# Patient Record
Sex: MALE | Race: BLACK OR AFRICAN AMERICAN | Employment: FULL TIME | URBAN - METROPOLITAN AREA
[De-identification: names, ages, dates, MRNs, and addresses within clinical notes are randomized per-mention and may not be internally consistent; named-entity substitution may affect disease eponyms.]

---

## 2017-07-15 DIAGNOSIS — I10 ESSENTIAL HYPERTENSION WITH GOAL BLOOD PRESSURE LESS THAN 140/90: ICD-10-CM

## 2017-07-17 RX ORDER — HYDROCHLOROTHIAZIDE 25 MG/1
TABLET ORAL
Qty: 90 TAB | Refills: 0 | Status: SHIPPED | OUTPATIENT
Start: 2017-07-17 | End: 2017-09-27 | Stop reason: SDUPTHER

## 2017-07-17 NOTE — TELEPHONE ENCOUNTER
He is due in for annual visit and fasting labs. He had a [facility] 6/2016 so doesn't have to have this year but does need visit and labs.

## 2017-09-14 ENCOUNTER — OFFICE VISIT (OUTPATIENT)
Dept: FAMILY MEDICINE CLINIC | Age: 47
End: 2017-09-14

## 2017-09-14 VITALS
HEART RATE: 86 BPM | OXYGEN SATURATION: 97 % | DIASTOLIC BLOOD PRESSURE: 90 MMHG | TEMPERATURE: 97.8 F | RESPIRATION RATE: 16 BRPM | WEIGHT: 207 LBS | HEIGHT: 72 IN | BODY MASS INDEX: 28.04 KG/M2 | SYSTOLIC BLOOD PRESSURE: 148 MMHG

## 2017-09-14 DIAGNOSIS — I10 ELEVATED BLOOD PRESSURE READING IN OFFICE WITH DIAGNOSIS OF HYPERTENSION: ICD-10-CM

## 2017-09-14 DIAGNOSIS — E55.9 VITAMIN D DEFICIENCY: ICD-10-CM

## 2017-09-14 DIAGNOSIS — I10 ESSENTIAL HYPERTENSION: Primary | ICD-10-CM

## 2017-09-14 RX ORDER — PILOCARPINE HYDROCHLORIDE 20 MG/ML
SOLUTION/ DROPS OPHTHALMIC
Refills: 1 | COMMUNITY
Start: 2017-07-13

## 2017-09-14 RX ORDER — AMLODIPINE BESYLATE 5 MG/1
5 TABLET ORAL DAILY
Qty: 30 TAB | Refills: 0 | Status: SHIPPED | OUTPATIENT
Start: 2017-09-14 | End: 2017-10-19 | Stop reason: SDUPTHER

## 2017-09-14 NOTE — LETTER
9/18/2017 12:20 PM 
 
Mr. Vandana Gutiérrez Rae [address] [id number] Dear Vandana Marla: 
 
Please find your most recent results below. Resulted Orders VITAMIN D, 25 HYDROXY Result Value Ref Range VITAMIN D, 25-HYDROXY 27.4 (L) 30.0 - 100.0 ng/mL Comment:  
   Vitamin D deficiency has been defined by the [facility] practice guideline as a 
level of serum 25-OH vitamin D less than 20 ng/mL (1,2). The Endocrine Society went on to further define vitamin D 
insufficiency as a level between 21 and 29 ng/mL (2). 1. IOM ([employer] [place] [employer]). 2010. Dietary reference 
   intakes for calcium and D. [facility]: [employer] 
   [employer]. 2. Jihan MF, Catie NC, Ralph LR, et al. 
   Evaluation, treatment, and prevention of vitamin D 
   deficiency: an Endocrine Society clinical practice 
   guideline. JCEM. 2011 Jul; 96(7):1911-30. Narrative Performed at:  [address]  [id number] : Janet Kelley MD, Phone:  [phone] CBC WITH AUTOMATED DIFF Result Value Ref Range WBC 6.1 3.4 - 10.8 x10E3/uL  
 RBC 5.48 4.14 - 5.80 x10E6/uL HGB 15.4 12.6 - 17.7 g/dL HCT 46.3 37.5 - 51.0 % MCV 85 79 - 97 fL  
 MCH 28.1 26.6 - 33.0 pg  
 MCHC 33.3 31.5 - 35.7 g/dL  
 RDW 14.7 12.3 - 15.4 % PLATELET 058 842 - 795 x10E3/uL NEUTROPHILS 68 % Lymphocytes 20 % MONOCYTES 9 % EOSINOPHILS 3 % BASOPHILS 0 %  
 ABS. NEUTROPHILS 4.2 1.4 - 7.0 x10E3/uL Abs Lymphocytes 1.2 0.7 - 3.1 x10E3/uL  
 ABS. MONOCYTES 0.5 0.1 - 0.9 x10E3/uL  
 ABS. EOSINOPHILS 0.2 0.0 - 0.4 x10E3/uL  
 ABS. BASOPHILS 0.0 0.0 - 0.2 x10E3/uL IMMATURE GRANULOCYTES 0 %  
 ABS. IMM. GRANS. 0.0 0.0 - 0.1 x10E3/uL Narrative Performed at:  [address]  [id number] : Janet Kelley MD, Phone:  [phone] URINALYSIS W/ RFLX MICROSCOPIC Result Value Ref Range Specific Gravity 1.016 1.005 - 1.030  
 pH (UA) 6.5 5.0 - 7.5 Color Yellow Yellow Appearance Clear Clear Leukocyte Esterase Negative Negative Protein Negative Negative/Trace Glucose Negative Negative Ketone Negative Negative Blood Negative Negative Bilirubin Negative Negative Urobilinogen 0.2 0.2 - 1.0 mg/dL Nitrites Negative Negative Microscopic Examination Comment Comment:  
   Microscopic not indicated and not performed. Narrative Performed at:  [address]  [id number] : Brett Martinez MD, Phone:  [phone] TSH 3RD GENERATION Result Value Ref Range TSH 1.700 0.450 - 4.500 uIU/mL Narrative Performed at:  [address]  [id number] : Brett Martinez MD, Phone:  [phone] METABOLIC PANEL, COMPREHENSIVE Result Value Ref Range Glucose 94 65 - 99 mg/dL BUN 13 6 - 24 mg/dL Creatinine 1.16 0.76 - 1.27 mg/dL GFR est non-AA 76 >59 mL/min/1.73 GFR est AA 87 >59 mL/min/1.73  
 BUN/Creatinine ratio 11 9 - 20 Sodium 143 134 - 144 mmol/L Potassium 3.4 (L) 3.5 - 5.2 mmol/L Chloride 99 96 - 106 mmol/L  
 CO2 26 18 - 29 mmol/L Calcium 9.6 8.7 - 10.2 mg/dL Protein, total 7.4 6.0 - 8.5 g/dL Albumin 4.6 3.5 - 5.5 g/dL GLOBULIN, TOTAL 2.8 1.5 - 4.5 g/dL A-G Ratio 1.6 1.2 - 2.2 Bilirubin, total 0.6 0.0 - 1.2 mg/dL Alk. phosphatase 106 39 - 117 IU/L  
 AST (SGOT) 19 0 - 40 IU/L  
 ALT (SGPT) 33 0 - 44 IU/L Narrative Performed at:  [address]  [id number] : Brett Martinez MD, Phone:  [phone] LIPID PANEL Result Value Ref Range Cholesterol, total 174 100 - 199 mg/dL Triglyceride 139 0 - 149 mg/dL HDL Cholesterol 40 >39 mg/dL VLDL, calculated 28 5 - 40 mg/dL LDL, calculated 106 (H) 0 - 99 mg/dL Narrative Performed at:  [address]  [id number] : Nell Alfonso MD, Phone:  [phone] CVD REPORT Result Value Ref Range INTERPRETATION Note Comment:  
   Supplement report is available. Narrative Performed at:  [address] [place] [address] [address] [place] [address]  [id number] : Aurelia Woodward PhD, Phone:  [phone] Yissel Gonzales MD

## 2017-09-14 NOTE — PROGRESS NOTES
Chief Complaint   Patient presents with    Blood Pressure Check    Labs     Fasting     1. Have you been to the ER, urgent care clinic since your last visit? Hospitalized since your last visit? No    2. Have you seen or consulted any other health care providers outside of the [facility] since your last visit? Include any pap smears or colon screening. No     Advance Care Planning information reviewed and given to the patient. I have reviewed Health Maintenance with the patient and updated. The patient was counseled on the dangers of tobacco use, and Patient is a non smoker. Reviewed strategies to maximize success, including Continue not to smoke.

## 2017-09-14 NOTE — PROGRESS NOTES
BP's 130/90's at home. Volleyball once weekly. Visit Vitals    /90 (BP 1 Location: Right arm, BP Patient Position: Sitting)    Pulse 86    Temp 97.8 °F (36.6 °C) (Oral)    Resp 16    Ht 6' (1.829 m)    Wt 207 lb (93.9 kg)    SpO2 97%    BMI 28.07 kg/m2       Patient alert and cooperative. Reviewed above. Assessment:  1. HTN with high readings both in the office and at home. Plan:  1. Will add Norvasc 5 mg to current HCTZ. Give it a month. Follow up if benefit for annual script. 2. Fasting labs today. 3. Follow up otherwise prn.

## 2017-09-14 NOTE — MR AVS SNAPSHOT
Visit Information Date & Time Provider Department Dept. Phone Encounter #  
 9/14/2017  9:00 AM Alexx Polo MD [facility] [phone] [id number] Upcoming Health Maintenance Date Due DTaP/Tdap/Td series (2 - Td) 7/2/2018 Allergies as of 9/14/2017  Review Complete On: 9/14/2017 By: Jason Aden RN Severity Noted Reaction Type Reactions Ace Inhibitors  01/20/2011    Cough Current Immunizations  Reviewed on 5/9/2012 Name Date Hepatitis B Vaccine 2/17/2009, 9/17/2008, 8/13/2008 MMR Vaccine 8/13/1977 OPV 8/20/1975, 1972, 1972 TDAP Vaccine 7/2/2008 Varicella Virus Vaccine Live 9/17/2008, 8/19/2008 Not reviewed this visit You Were Diagnosed With   
  
 Codes Comments Essential hypertension    -  Primary ICD-10-CM: I10 
ICD-9-CM: 401.9 Vitamin D deficiency     ICD-10-CM: E55.9 ICD-9-CM: 268.9 Elevated blood pressure reading in office with diagnosis of hypertension     ICD-10-CM: I10 
ICD-9-CM: 401.9 Vitals BP Pulse Temp Resp Height(growth percentile) Weight(growth percentile) 148/90 (BP 1 Location: Right arm, BP Patient Position: Sitting) 86 97.8 °F (36.6 °C) (Oral) 16 6' (1.829 m) 207 lb (93.9 kg) SpO2 BMI Smoking Status 97% 28.07 kg/m2 Never Smoker Vitals History BMI and BSA Data Body Mass Index Body Surface Area 28.07 kg/m 2 2.18 m 2 Preferred Pharmacy Pharm[facility] Name Phone [id number] [address] [phone] Your Updated Medication List  
  
   
This list is accurate as of: 9/14/17  9:24 AM.  Always use your most recent med list. amLODIPine 5 mg tablet Commonly known as:  Remonia Grates Take 1 Tab by mouth daily. Indications: hypertension  
  
 hydroCHLOROthiazide 25 mg tablet Commonly known as:  HYDRODIURIL  
TAKE 1 TABLET DAILY pilocarpine 2 % ophthalmic solution Commonly known as:  PILOCAR INSTILL 1 DROP INTO LEFT EYE AT BEDTIME  
  
 ZYRTEC PO Take  by mouth as needed. Prescriptions Sent to Pharmacy Refills  
 amLODIPine (NORVASC) 5 mg tablet 0 Sig: Take 1 Tab by mouth daily. Indications: hypertension Class: Normal  
 Pharmacy: [facility] #: [phone] Route: Oral  
  
We Performed the Following CBC WITH AUTOMATED DIFF [41157 CPT(R)] LIPID PANEL [28626 CPT(R)] METABOLIC PANEL, COMPREHENSIVE [58764 CPT(R)] TSH 3RD GENERATION [57092 CPT(R)] URINALYSIS W/ RFLX MICROSCOPIC [01841 CPT(R)] VITAMIN D, 25 HYDROXY [id number] CPT(R)] Introducing \[facility] [place] [facility]\"" & [facility] SERVICES! Dear Tessa Das: Thank you for requesting a HStreaming account. Our records indicate that you already have an active HStreaming account. You can access your account anytime at [email or web address] Did you know that you can access your hospital and ER discharge instructions at any time in HStreaming? You can also review all of your test results from your hospital stay or ER visit. Additional Information If you have questions, please visit the Frequently Asked Questions section of the HStreaming website at [email or web address] Remember, HStreaming is NOT to be used for urgent needs. For medical emergencies, dial 911. Now available from your iPhone and Android! Please provide this summary of care documentation to your next provider. Your primary care clinician is listed as [phone]. If you have any questions after today's visit, please call [phone].

## 2017-09-15 LAB
25(OH)D3+25(OH)D2 SERPL-MCNC: 27.4 NG/ML (ref 30–100)
ALBUMIN SERPL-MCNC: 4.6 G/DL (ref 3.5–5.5)
ALBUMIN/GLOB SERPL: 1.6 {RATIO} (ref 1.2–2.2)
ALP SERPL-CCNC: 106 IU/L (ref 39–117)
ALT SERPL-CCNC: 33 IU/L (ref 0–44)
APPEARANCE UR: CLEAR
AST SERPL-CCNC: 19 IU/L (ref 0–40)
BASOPHILS # BLD AUTO: 0 X10E3/UL (ref 0–0.2)
BASOPHILS NFR BLD AUTO: 0 %
BILIRUB SERPL-MCNC: 0.6 MG/DL (ref 0–1.2)
BILIRUB UR QL STRIP: NEGATIVE
BUN SERPL-MCNC: 13 MG/DL (ref 6–24)
BUN/CREAT SERPL: 11 (ref 9–20)
CALCIUM SERPL-MCNC: 9.6 MG/DL (ref 8.7–10.2)
CHLORIDE SERPL-SCNC: 99 MMOL/L (ref 96–106)
CHOLEST SERPL-MCNC: 174 MG/DL (ref 100–199)
CO2 SERPL-SCNC: 26 MMOL/L (ref 18–29)
COLOR UR: YELLOW
CREAT SERPL-MCNC: 1.16 MG/DL (ref 0.76–1.27)
EOSINOPHIL # BLD AUTO: 0.2 X10E3/UL (ref 0–0.4)
EOSINOPHIL NFR BLD AUTO: 3 %
ERYTHROCYTE [DISTWIDTH] IN BLOOD BY AUTOMATED COUNT: 14.7 % (ref 12.3–15.4)
GLOBULIN SER CALC-MCNC: 2.8 G/DL (ref 1.5–4.5)
GLUCOSE SERPL-MCNC: 94 MG/DL (ref 65–99)
GLUCOSE UR QL: NEGATIVE
HCT VFR BLD AUTO: 46.3 % (ref 37.5–51)
HDLC SERPL-MCNC: 40 MG/DL
HGB BLD-MCNC: 15.4 G/DL (ref 12.6–17.7)
HGB UR QL STRIP: NEGATIVE
IMM GRANULOCYTES # BLD: 0 X10E3/UL (ref 0–0.1)
IMM GRANULOCYTES NFR BLD: 0 %
INTERPRETATION, 910389: NORMAL
KETONES UR QL STRIP: NEGATIVE
LDLC SERPL CALC-MCNC: 106 MG/DL (ref 0–99)
LEUKOCYTE ESTERASE UR QL STRIP: NEGATIVE
LYMPHOCYTES # BLD AUTO: 1.2 X10E3/UL (ref 0.7–3.1)
LYMPHOCYTES NFR BLD AUTO: 20 %
MCH RBC QN AUTO: 28.1 PG (ref 26.6–33)
MCHC RBC AUTO-ENTMCNC: 33.3 G/DL (ref 31.5–35.7)
MCV RBC AUTO: 85 FL (ref 79–97)
MICRO URNS: NORMAL
MONOCYTES # BLD AUTO: 0.5 X10E3/UL (ref 0.1–0.9)
MONOCYTES NFR BLD AUTO: 9 %
NEUTROPHILS # BLD AUTO: 4.2 X10E3/UL (ref 1.4–7)
NEUTROPHILS NFR BLD AUTO: 68 %
NITRITE UR QL STRIP: NEGATIVE
PH UR STRIP: 6.5 [PH] (ref 5–7.5)
PLATELET # BLD AUTO: 184 X10E3/UL (ref 150–379)
POTASSIUM SERPL-SCNC: 3.4 MMOL/L (ref 3.5–5.2)
PROT SERPL-MCNC: 7.4 G/DL (ref 6–8.5)
PROT UR QL STRIP: NEGATIVE
RBC # BLD AUTO: 5.48 X10E6/UL (ref 4.14–5.8)
SODIUM SERPL-SCNC: 143 MMOL/L (ref 134–144)
SP GR UR: 1.02 (ref 1–1.03)
TRIGL SERPL-MCNC: 139 MG/DL (ref 0–149)
TSH SERPL DL<=0.005 MIU/L-ACNC: 1.7 UIU/ML (ref 0.45–4.5)
UROBILINOGEN UR STRIP-MCNC: 0.2 MG/DL (ref 0.2–1)
VLDLC SERPL CALC-MCNC: 28 MG/DL (ref 5–40)
WBC # BLD AUTO: 6.1 X10E3/UL (ref 3.4–10.8)

## 2017-09-27 DIAGNOSIS — I10 ESSENTIAL HYPERTENSION WITH GOAL BLOOD PRESSURE LESS THAN 140/90: ICD-10-CM

## 2017-10-02 RX ORDER — HYDROCHLOROTHIAZIDE 25 MG/1
TABLET ORAL
Qty: 90 TAB | Refills: 3 | Status: SHIPPED | OUTPATIENT
Start: 2017-10-02 | End: 2019-01-21 | Stop reason: SDUPTHER

## 2017-10-19 DIAGNOSIS — I10 ESSENTIAL HYPERTENSION: ICD-10-CM

## 2017-10-19 RX ORDER — AMLODIPINE BESYLATE 5 MG/1
TABLET ORAL
Qty: 30 TAB | Refills: 0 | Status: SHIPPED | OUTPATIENT
Start: 2017-10-19 | End: 2017-11-02 | Stop reason: SDUPTHER

## 2017-11-02 ENCOUNTER — OFFICE VISIT (OUTPATIENT)
Dept: FAMILY MEDICINE CLINIC | Age: 47
End: 2017-11-02

## 2017-11-02 VITALS
BODY MASS INDEX: 28.24 KG/M2 | RESPIRATION RATE: 20 BRPM | WEIGHT: 213.1 LBS | HEART RATE: 86 BPM | SYSTOLIC BLOOD PRESSURE: 126 MMHG | OXYGEN SATURATION: 96 % | DIASTOLIC BLOOD PRESSURE: 98 MMHG | HEIGHT: 73 IN | TEMPERATURE: 97.8 F

## 2017-11-02 DIAGNOSIS — I10 ESSENTIAL HYPERTENSION: Primary | ICD-10-CM

## 2017-11-02 DIAGNOSIS — I10 ELEVATED BLOOD PRESSURE READING IN OFFICE WITH DIAGNOSIS OF HYPERTENSION: ICD-10-CM

## 2017-11-02 RX ORDER — AMLODIPINE BESYLATE 5 MG/1
TABLET ORAL
Qty: 90 TAB | Refills: 3 | Status: SHIPPED | OUTPATIENT
Start: 2017-11-02 | End: 2018-11-20 | Stop reason: SDUPTHER

## 2017-11-02 NOTE — PROGRESS NOTES
Chief Complaint   Patient presents with    Hypertension     Blood pressure check       Reviewed record in preparation for visit and have obtained necessary documentation. Identified pt with two pt identifiers(name and ). Chief Complaint   Patient presents with    Hypertension     Blood pressure check       Vitals:    17 1632   BP: (!) 126/98   Pulse: 86   Resp: 20   Temp: 97.8 °F (36.6 °C)   TempSrc: Oral   SpO2: 96%   Weight: 213 lb 1.6 oz (96.7 kg)   Height: 6' 1\" (1.854 m)   PainSc:   0 - No pain       Coordination of Care Questionnaire:  :     1) Have you been to an emergency room, urgent care clinic since your last visit? no   Hospitalized since your last visit? no             2) Have you seen or consulted any other health care providers outside of [address] [place] [address] since your last visit? no  (Include any pap smears or colon screenings in this section.)    3) In the event something were to happen to you and you were unable to speak on your behalf, do you have an Advance Directive/ Living Will in place stating your wishes? NO    Do you have an Advance Directive on file? no    4) Are you interested in receiving information on Advance Directives? NO    Patient is accompanied by self I have received verbal consent from Aleisha Camejo to discuss any/all medical information while they are present in the room.

## 2017-11-02 NOTE — PROGRESS NOTES
/86 yest.  2-3 days ago 120/80's. Working on diet and exercise. Visit Vitals    BP (!) 126/98 (BP 1 Location: Left arm, BP Patient Position: Sitting)    Pulse 86    Temp 97.8 °F (36.6 °C) (Oral)    Resp 20    Ht 6' 1\" (1.854 m)    Wt 213 lb 1.6 oz (96.7 kg)    SpO2 96%    BMI 28.12 kg/m2       Patient alert and cooperative. Reviewed above. Assessment:  1. HTN, good readings at home. Still a little higher here, but will take home readings. Plan:  1. Refilled meds, new med for a year. 2. Return for annual visit, labs next September. 3. Follow otherwise here prn.

## 2018-09-18 ENCOUNTER — OFFICE VISIT (OUTPATIENT)
Dept: FAMILY MEDICINE CLINIC | Age: 48
End: 2018-09-18

## 2018-09-18 VITALS
SYSTOLIC BLOOD PRESSURE: 146 MMHG | RESPIRATION RATE: 19 BRPM | DIASTOLIC BLOOD PRESSURE: 100 MMHG | BODY MASS INDEX: 27.67 KG/M2 | WEIGHT: 204.3 LBS | OXYGEN SATURATION: 97 % | HEIGHT: 72 IN | TEMPERATURE: 98.3 F | HEART RATE: 78 BPM

## 2018-09-18 DIAGNOSIS — Z80.0 FH: COLON CANCER: ICD-10-CM

## 2018-09-18 DIAGNOSIS — E55.9 VITAMIN D DEFICIENCY: ICD-10-CM

## 2018-09-18 DIAGNOSIS — Z00.00 LABORATORY EXAM ORDERED AS PART OF ROUTINE GENERAL MEDICAL EXAMINATION: ICD-10-CM

## 2018-09-18 DIAGNOSIS — I10 ESSENTIAL HYPERTENSION: ICD-10-CM

## 2018-09-18 DIAGNOSIS — Z00.00 PHYSICAL EXAM: Primary | ICD-10-CM

## 2018-09-18 DIAGNOSIS — Z23 ENCOUNTER FOR IMMUNIZATION: ICD-10-CM

## 2018-09-18 DIAGNOSIS — I10 ELEVATED BLOOD PRESSURE READING IN OFFICE WITH DIAGNOSIS OF HYPERTENSION: ICD-10-CM

## 2018-09-18 NOTE — PROGRESS NOTES
HISTORY OF PRESENT ILLNESS Fely Carrasco is a 57 y.o. male. HPI Here for [facility]. Eye doctor past yr. [facility] FH MGM and Mother sister with colon cancer. Less volleyball. Walks dog daily. No signif hx past yr. Patient Active Problem List  
Diagnosis Code  Essential hypertension I10  
 FH: colon cancer Z80.0  Vitamin D deficiency E55.9  Allergic rhinitis J30.9  Elevated blood pressure reading in office with diagnosis of hypertension I10 Current Outpatient Prescriptions Medication Sig Dispense Refill  amLODIPine (NORVASC) 5 mg tablet TAKE 1 TABLET BY MOUTH EVERY DAY  Indications: hypertension 90 Tab 3  
 hydroCHLOROthiazide (HYDRODIURIL) 25 mg tablet TAKE 1 TABLET DAILY 90 Tab 3  pilocarpine (PILOCAR) 2 % ophthalmic solution INSTILL 1 DROP INTO LEFT EYE AT BEDTIME  1 Allergies Allergen Reactions  Ace Inhibitors Cough Past Medical History:  
Diagnosis Date  Advance directive discussed with patient 06/15/2016  Chronic pain   
 low back  Family history of colorectal cancer  Glaucoma suspect 12/03/14 [facility]     high risk OU 7/10/15  Hypertension  Other ill-defined conditions(799.89) MMR immune by titer  Seasonal allergic rhinitis  Vitamin D deficiency 1/2014 Past Surgical History:  
Procedure Laterality Date  HX HEENT    
 wisdom teeth Family History Problem Relation Age of Onset  Hypertension Mother  Hypertension Father  Elevated Lipids Father  Diabetes Father  Hypertension Maternal Grandmother  Cancer Maternal Grandmother   
  colon  Hypertension Maternal Grandfather  Hypertension Paternal Grandmother  Hypertension Paternal Grandfather Social History Substance Use Topics  Smoking status: Never Smoker  Smokeless tobacco: Never Used  Alcohol use Yes Comment: occassionaly Lab Results Component Value Date/Time WBC 6.1 09/14/2017 09:35 AM  
HGB 15.4 09/14/2017 09:35 AM  
HCT 46.3 09/14/2017 09:35 AM  
PLATELET 495 41/05/3189 09:35 AM  
MCV 85 09/14/2017 09:35 AM  
 
Lab Results Component Value Date/Time Glucose 94 09/14/2017 09:35 AM  
LDL, calculated 106 (H) 09/14/2017 09:35 AM  
Creatinine 1.16 09/14/2017 09:35 AM  
  
Lab Results Component Value Date/Time Cholesterol, total 174 09/14/2017 09:35 AM  
HDL Cholesterol 40 09/14/2017 09:35 AM  
LDL, calculated 106 (H) 09/14/2017 09:35 AM  
Triglyceride 139 09/14/2017 09:35 AM  
 
Lab Results Component Value Date/Time ALT (SGPT) 33 09/14/2017 09:35 AM  
AST (SGOT) 19 09/14/2017 09:35 AM  
Alk. phosphatase 106 09/14/2017 09:35 AM  
Bilirubin, total 0.6 09/14/2017 09:35 AM  
Albumin 4.6 09/14/2017 09:35 AM  
Protein, total 7.4 09/14/2017 09:35 AM  
PLATELET 173 24/53/1029 09:35 AM  
 
 
Lab Results Component Value Date/Time GFR est non-AA 76 09/14/2017 09:35 AM  
GFR est AA 87 09/14/2017 09:35 AM  
Creatinine 1.16 09/14/2017 09:35 AM  
BUN 13 09/14/2017 09:35 AM  
Sodium 143 09/14/2017 09:35 AM  
Potassium 3.4 (L) 09/14/2017 09:35 AM  
Chloride 99 09/14/2017 09:35 AM  
CO2 26 09/14/2017 09:35 AM  
 
Lab Results Component Value Date/Time TSH 1.700 09/14/2017 09:35 AM  
  
Lab Results Component Value Date/Time Glucose 94 09/14/2017 09:35 AM  
   
Fasting for labs Review of Systems Constitutional: Negative. HENT: Negative. Eyes: Negative. Respiratory: Positive for cough. Cardiovascular: Negative. Gastrointestinal: Negative. Genitourinary: Negative. Musculoskeletal: Negative. Skin: Negative. Neurological: Negative. Endo/Heme/Allergies: Positive for environmental allergies. Psychiatric/Behavioral: Negative. Physical Exam  
Vitals:  
 09/18/18 1014 09/18/18 1021 BP: (!) 148/107 (!) 146/100 Pulse: 78 Resp: 19 Temp: 98.3 °F (36.8 °C) TempSrc: Oral   
SpO2: 97% Weight: 204 lb 4.8 oz (92.7 kg) Height: 6' 0.25\" (1.835 m) General  alert, cooperative, no distress, appears stated age Head  Normocephalic, without obvious abnormality, atraumatic Eyes  conjunctivae/corneas clear. PERRL. Fundi benign Ears  Canals and TMs clear Nose Passages patent. Mucosa normal. No drainage or sinus tenderness. Throat Lips, mucosa, and tongue normal. Teeth and gums normal.  Post pharynx neg. Neck supple, nontender, no adenopathy, thyroid: not enlarged, no masses/nodules, no carotid bruits Back   symmetric, no curvature. FROM. No CVA tenderness Lungs   clear to auscultation bilaterally Chest wall  no tenderness Heart  regular rate and rhythm, no murmur, click, rub or gallop Abdomen   soft, non-tender. Bowel sounds normal. No masses,  No organomegaly Genitalia  Testes descended bilat w/o masses. No hernias Rectal  Normal tone, normal prostate, no masses or tenderness Extremities extremities normal, atraumatic, no cyanosis or edema Pulses 2+ and symmetric Skin No rashes or lesions Neurologic Romberg neg, nml heel, toe and Tandem gait. DTRs 2+ symmetric ASSESSMENT and PLAN 
  ICD-10-CM ICD-9-CM 1. Physical exam Z00.00 V70.9 VITAMIN D, 25 HYDROXY  
   CBC WITH AUTOMATED DIFF  
   URINALYSIS W/ RFLX MICROSCOPIC  
   TSH 3RD GENERATION  
   METABOLIC PANEL, COMPREHENSIVE  
   LIPID PANEL 2. FH: colon cancer Z80.0 V16.0 COLOGUARD TEST (FECAL DNA COLORECTAL CANCER SCREENING) 3. Essential hypertension I10 401.9 CBC WITH AUTOMATED DIFF  
   URINALYSIS W/ RFLX MICROSCOPIC  
   TSH 3RD GENERATION  
   METABOLIC PANEL, COMPREHENSIVE  
   LIPID PANEL 4. Vitamin D deficiency E55.9 268.9 VITAMIN D, 25 HYDROXY 5. Elevated blood pressure reading in office with diagnosis of hypertension I10 401.9 6. Encounter for immunization Z23 V03.89 CANCELED: TETANUS AND DIPHTHERIA TOXOIDS (TD) ADSORBED, PRES.  FREE, IN INDIVIDS. >=7, IM  
 CANCELED: MT IMMUNIZ ADMIN,1 SINGLE/COMB VAC/TOXOID 7. Laboratory exam ordered as part of routine general medical examination Z00.00 V72.62 VITAMIN D, 25 HYDROXY  
   CBC WITH AUTOMATED DIFF  
   URINALYSIS W/ RFLX MICROSCOPIC  
   TSH 3RD GENERATION  
   METABOLIC PANEL, COMPREHENSIVE  
   LIPID PANEL Follow-up Disposition: 
Return in about 1 year (around 9/18/2019) for Annual OV, labs.

## 2018-09-18 NOTE — PROGRESS NOTES
Julio Garcia  Identified pt with two pt identifiers(name and ). No chief complaint on file. Patient declines influenza at this time. 1. Have you been to the ER, urgent care clinic since your last visit? Hospitalized since your last visit? no 
 
2. Have you seen or consulted any other health care providers outside of the [address] [place] [address] since your last visit? Include any pap smears or colon screening. no 
 
 
Advance Care Planning In the event something were to happen to you and you were unable to speak on your behalf, do you have an Advance Directive/ Living Will in place stating your wishes? no If yes, do we have a copy on file no If no, would you like information yes Medication reconciliation up to date and corrected with patient at this time. Today's provider has been notified of reason for visit, vitals and flowsheets obtained on patients. Reviewed record in preparation for visit, huddled with provider and have obtained necessary documentation. Health Maintenance Due Topic  DTaP/Tdap/Td series (2 - Td)  Influenza Age 5 to Adult Wt Readings from Last 3 Encounters:  
18 204 lb 4.8 oz (92.7 kg) 17 213 lb 1.6 oz (96.7 kg) 17 207 lb (93.9 kg) Temp Readings from Last 3 Encounters:  
18 98.3 °F (36.8 °C) (Oral) 17 97.8 °F (36.6 °C) (Oral) 17 97.8 °F (36.6 °C) (Oral) BP Readings from Last 3 Encounters:  
18 (!) 148/107  
17 (!) 126/98  
17 148/90 Pulse Readings from Last 3 Encounters:  
18 78  
17 86  
17 86 Vitals:  
 18 1014 BP: (!) 148/107 Pulse: 78 Resp: 19 Temp: 98.3 °F (36.8 °C) TempSrc: Oral  
SpO2: 97% Weight: 204 lb 4.8 oz (92.7 kg) Height: 6' 0.25\" (1.835 m) Learning Assessment: 
:  
 
Learning Assessment 2014 PRIMARY LEARNER Patient Patient HIGHEST LEVEL OF EDUCATION - PRIMARY LEARNER  > 4 YEARS OF COLLEGE -  
PRIMARY LANGUAGE ENGLISH ENGLISH  
LEARNER PREFERENCE PRIMARY DEMONSTRATION DEMONSTRATION  
ANSWERED BY patient rick kearney RELATIONSHIP SELF SELF Depression Screening: 
:  
 
PHQ over the last two weeks 9/18/2018 Little interest or pleasure in doing things Not at all Feeling down, depressed, irritable, or hopeless Not at all Total Score PHQ 2 0 Fall Risk Assessment: 
:  
 
No flowsheet data found. Abuse Screening: 
:  
 
Abuse Screening Questionnaire 9/18/2018 Do you ever feel afraid of your partner? [place] Are you in a relationship with someone who physically or mentally threatens you? [place] Is it safe for you to go home? Y  
 
 
ADL Screening: 
:  
 
ADL Assessment 11/2/2017 Feeding yourself No Help Needed Getting from bed to chair No Help Needed Getting dressed No Help Needed Bathing or showering No Help Needed Walk across the room (includes cane/walker) No Help Needed Using the telphone No Help Needed Taking your medications No Help Needed Preparing meals No Help Needed Managing money (expenses/bills) No Help Needed Moderately strenuous housework (laundry) No Help Needed Shopping for personal items (toiletries/medicines) No Help Needed Shopping for groceries No Help Needed Driving No Help Needed Climbing a flight of stairs No Help Needed Getting to places beyond walking distances No Help Needed Medication reconciliation up to date and corrected with patient at this time.

## 2018-09-18 NOTE — MR AVS SNAPSHOT
[address] [place] [address] 
[address] [place] [address] 
[phone] Patient: Edward Myers MRN: [id number] DBT:[birth year] Visit Information Date & Time Provider Department Dept. Phone Encounter #  
 9/18/2018 10:20 AM Mere Patel MD [facility] [phone] [id number] Follow-up Instructions Return in about 1 year (around 9/18/2019) for Annual OV, labs. Upcoming Health Maintenance Date Due DTaP/Tdap/Td series (2 - Td) 7/2/2018 Influenza Age 5 to Adult 3/31/2019* *Topic was postponed. The date shown is not the original due date. Allergies as of 9/18/2018  Review Complete On: 9/18/2018 By: Mere Patel MD  
  
 Severity Noted Reaction Type Reactions Ace Inhibitors  01/20/2011    Cough Current Immunizations  Reviewed on 5/9/2012 Name Date Hepatitis B Vaccine 2/17/2009, 9/17/2008, 8/13/2008 MMR Vaccine 8/13/1977 OPV 8/20/1975, 1972, 1972 TDAP Vaccine 7/2/2008 Varicella Virus Vaccine Live 9/17/2008, 8/19/2008 Not reviewed this visit You Were Diagnosed With   
  
 Codes Comments Physical exam    -  Primary ICD-10-CM: Z00.00 ICD-9-CM: V70.9 FH: colon cancer     ICD-10-CM: Z80.0 ICD-9-CM: V16.0 Essential hypertension     ICD-10-CM: I10 
ICD-9-CM: 401.9 Vitamin D deficiency     ICD-10-CM: E55.9 ICD-9-CM: 268.9 Elevated blood pressure reading in office with diagnosis of hypertension     ICD-10-CM: I10 
ICD-9-CM: 401.9 Encounter for immunization     ICD-10-CM: Y64 ICD-9-CM: V03.89 Laboratory exam ordered as part of routine general medical examination     ICD-10-CM: Z00.00 ICD-9-CM: V72.62 Vitals BP Pulse Temp Resp Height(growth percentile) Weight(growth percentile) (!) 146/100 (BP 1 Location: Left arm, BP Patient Position: Sitting) 78 98.3 °F (36.8 °C) (Oral) 19 6' 0.25\" (1.835 m) 204 lb 4.8 oz (92.7 kg) SpO2 BMI Smoking Status 97% 27.52 kg/m2 Never Smoker Vitals History BMI and BSA Data Body Mass Index Body Surface Area  
 27.52 kg/m 2 2.17 m 2 Preferred Pharmacy Pharmacy Name Phone [id number] [phone] Your Updated Medication List  
  
   
This list is accurate as of 9/18/18 10:45 AM.  Always use your most recent med list. amLODIPine 5 mg tablet Commonly known as:  Jero Vick TAKE 1 TABLET BY MOUTH EVERY DAY  Indications: hypertension  
  
 hydroCHLOROthiazide 25 mg tablet Commonly known as:  HYDRODIURIL  
TAKE 1 TABLET DAILY pilocarpine 2 % ophthalmic solution Commonly known as:  PILOCAR INSTILL 1 DROP INTO LEFT EYE AT BEDTIME We Performed the Following CBC WITH AUTOMATED DIFF [51693 CPT(R)] COLOGUARD TEST (FECAL DNA COLORECTAL CANCER SCREENING) [64862 CPT(R)] LIPID PANEL [07110 CPT(R)] METABOLIC PANEL, COMPREHENSIVE [49387 CPT(R)] TSH 3RD GENERATION [85416 CPT(R)] URINALYSIS W/ RFLX MICROSCOPIC [59472 CPT(R)] VITAMIN D, 25 HYDROXY [id number] CPT(R)] Follow-up Instructions Return in about 1 year (around 9/18/2019) for Annual OV, labs. Introducing [facility] & HEALTH SERVICES! Dear Baljeet Judge: Thank you for requesting a Heliatek account. Our records indicate that you already have an active Heliatek account. You can access your account anytime at [email or web address] Did you know that you can access your hospital and ER discharge instructions at any time in Heliatek? You can also review all of your test results from your hospital stay or ER visit. Additional Information If you have questions, please visit the Frequently Asked Questions section of the Heliatek website at [email or web address] Remember, Heliatek is NOT to be used for urgent needs.  For medical emergencies, dial 911. Now available from your iPhone and Android! Please provide this summary of care documentation to your next provider. Your primary care clinician is listed as [phone] If you have any questions after today's visit, please call [phone].

## 2018-09-19 LAB
25(OH)D3+25(OH)D2 SERPL-MCNC: 35.9 NG/ML (ref 30–100)
ALBUMIN SERPL-MCNC: 4.6 G/DL (ref 3.5–5.5)
ALBUMIN/GLOB SERPL: 1.7 {RATIO} (ref 1.2–2.2)
ALP SERPL-CCNC: 92 IU/L (ref 39–117)
ALT SERPL-CCNC: 27 IU/L (ref 0–44)
APPEARANCE UR: CLEAR
AST SERPL-CCNC: 18 IU/L (ref 0–40)
BASOPHILS # BLD AUTO: 0 X10E3/UL (ref 0–0.2)
BASOPHILS NFR BLD AUTO: 0 %
BILIRUB SERPL-MCNC: 0.5 MG/DL (ref 0–1.2)
BILIRUB UR QL STRIP: NEGATIVE
BUN SERPL-MCNC: 10 MG/DL (ref 6–24)
BUN/CREAT SERPL: 9 (ref 9–20)
CALCIUM SERPL-MCNC: 9.6 MG/DL (ref 8.7–10.2)
CHLORIDE SERPL-SCNC: 103 MMOL/L (ref 96–106)
CHOLEST SERPL-MCNC: 143 MG/DL (ref 100–199)
CO2 SERPL-SCNC: 25 MMOL/L (ref 20–29)
COLOR UR: YELLOW
CREAT SERPL-MCNC: 1.17 MG/DL (ref 0.76–1.27)
EOSINOPHIL # BLD AUTO: 0.2 X10E3/UL (ref 0–0.4)
EOSINOPHIL NFR BLD AUTO: 3 %
ERYTHROCYTE [DISTWIDTH] IN BLOOD BY AUTOMATED COUNT: 14.9 % (ref 12.3–15.4)
GLOBULIN SER CALC-MCNC: 2.7 G/DL (ref 1.5–4.5)
GLUCOSE SERPL-MCNC: 83 MG/DL (ref 65–99)
GLUCOSE UR QL: NEGATIVE
HCT VFR BLD AUTO: 42.7 % (ref 37.5–51)
HDLC SERPL-MCNC: 38 MG/DL
HGB BLD-MCNC: 14.7 G/DL (ref 13–17.7)
HGB UR QL STRIP: NEGATIVE
IMM GRANULOCYTES # BLD: 0 X10E3/UL (ref 0–0.1)
IMM GRANULOCYTES NFR BLD: 0 %
INTERPRETATION, 910389: NORMAL
KETONES UR QL STRIP: NEGATIVE
LDLC SERPL CALC-MCNC: 77 MG/DL (ref 0–99)
LEUKOCYTE ESTERASE UR QL STRIP: NEGATIVE
LYMPHOCYTES # BLD AUTO: 1.3 X10E3/UL (ref 0.7–3.1)
LYMPHOCYTES NFR BLD AUTO: 24 %
MCH RBC QN AUTO: 28.6 PG (ref 26.6–33)
MCHC RBC AUTO-ENTMCNC: 34.4 G/DL (ref 31.5–35.7)
MCV RBC AUTO: 83 FL (ref 79–97)
MICRO URNS: NORMAL
MONOCYTES # BLD AUTO: 0.4 X10E3/UL (ref 0.1–0.9)
MONOCYTES NFR BLD AUTO: 8 %
NEUTROPHILS # BLD AUTO: 3.4 X10E3/UL (ref 1.4–7)
NEUTROPHILS NFR BLD AUTO: 65 %
NITRITE UR QL STRIP: NEGATIVE
PH UR STRIP: 6 [PH] (ref 5–7.5)
PLATELET # BLD AUTO: 178 X10E3/UL (ref 150–379)
POTASSIUM SERPL-SCNC: 3.7 MMOL/L (ref 3.5–5.2)
PROT SERPL-MCNC: 7.3 G/DL (ref 6–8.5)
PROT UR QL STRIP: NEGATIVE
RBC # BLD AUTO: 5.14 X10E6/UL (ref 4.14–5.8)
SODIUM SERPL-SCNC: 141 MMOL/L (ref 134–144)
SP GR UR: 1.02 (ref 1–1.03)
TRIGL SERPL-MCNC: 139 MG/DL (ref 0–149)
TSH SERPL DL<=0.005 MIU/L-ACNC: 1.17 UIU/ML (ref 0.45–4.5)
UROBILINOGEN UR STRIP-MCNC: 0.2 MG/DL (ref 0.2–1)
VLDLC SERPL CALC-MCNC: 28 MG/DL (ref 5–40)
WBC # BLD AUTO: 5.2 X10E3/UL (ref 3.4–10.8)

## 2018-09-19 NOTE — PROGRESS NOTES
Spoke with patient after obtaining 2 patient identifiers Patient made aware of lab results. Verbalized understanding with no questions noted.

## 2018-11-20 DIAGNOSIS — I10 ESSENTIAL HYPERTENSION: ICD-10-CM

## 2018-11-20 RX ORDER — AMLODIPINE BESYLATE 5 MG/1
TABLET ORAL
Qty: 90 TAB | Refills: 2 | Status: SHIPPED | OUTPATIENT
Start: 2018-11-20 | End: 2019-10-02 | Stop reason: SDUPTHER

## 2018-11-20 NOTE — TELEPHONE ENCOUNTER
Requested Prescriptions     Pending Prescriptions Disp Refills    amLODIPine (NORVASC) 5 mg tablet 90 Tab 3     Sig: TAKE 1 TABLET BY MOUTH EVERY DAY

## 2018-11-20 NOTE — TELEPHONE ENCOUNTER
PCP: Stephenie Portillo MD    Last appt: 9/18/2018  No future appointments.     Requested Prescriptions     Pending Prescriptions Disp Refills    amLODIPine (NORVASC) 5 mg tablet 90 Tab 3     Sig: TAKE 1 TABLET BY MOUTH EVERY DAY       Prior labs and Blood pressures:  BP Readings from Last 3 Encounters:   09/18/18 (!) 146/100   11/02/17 (!) 126/98   09/14/17 148/90     Lab Results   Component Value Date/Time    Sodium 141 09/18/2018 02:37 PM    Potassium 3.7 09/18/2018 02:37 PM    Chloride 103 09/18/2018 02:37 PM    CO2 25 09/18/2018 02:37 PM    Anion gap 5 05/11/2009 08:47 AM    Glucose 83 09/18/2018 02:37 PM    BUN 10 09/18/2018 02:37 PM    Creatinine 1.17 09/18/2018 02:37 PM    BUN/Creatinine ratio 9 09/18/2018 02:37 PM    GFR est AA 86 09/18/2018 02:37 PM    GFR est non-AA 74 09/18/2018 02:37 PM    Calcium 9.6 09/18/2018 02:37 PM     No results found for: HBA1C, HGBE8, XIC2QTHS, RYR9VCLC  Lab Results   Component Value Date/Time    Cholesterol, total 143 09/18/2018 02:37 PM    HDL Cholesterol 38 (L) 09/18/2018 02:37 PM    LDL, calculated 77 09/18/2018 02:37 PM    VLDL, calculated 28 09/18/2018 02:37 PM    Triglyceride 139 09/18/2018 02:37 PM     Lab Results   Component Value Date/Time    VITAMIN D, 25-HYDROXY 35.9 09/18/2018 02:37 PM       Lab Results   Component Value Date/Time    TSH 1.170 09/18/2018 02:37 PM

## 2019-01-21 ENCOUNTER — TELEPHONE (OUTPATIENT)
Dept: FAMILY MEDICINE CLINIC | Age: 49
End: 2019-01-21

## 2019-01-21 DIAGNOSIS — I10 ESSENTIAL HYPERTENSION WITH GOAL BLOOD PRESSURE LESS THAN 140/90: ICD-10-CM

## 2019-01-21 RX ORDER — HYDROCHLOROTHIAZIDE 25 MG/1
TABLET ORAL
Qty: 90 TAB | Refills: 2 | Status: SHIPPED | OUTPATIENT
Start: 2019-01-21 | End: 2019-01-23 | Stop reason: SDUPTHER

## 2019-01-21 NOTE — TELEPHONE ENCOUNTER
PCP: Valentino Sniff, MD    Last appt: 9/18/2018  Future Appointments   Date Time Provider Jane Roche   1/29/2019  8:10 AM Desirae Vaughan MD [facility]       Requested Prescriptions     Pending Prescriptions Disp Refills    hydroCHLOROthiazide (HYDRODIURIL) 25 mg tablet [Pharmacy Med Name: HYDROCHLOROT TAB 25MG] 90 Tab 3     Sig: TAKE 1 TABLET DAILY       Prior labs and Blood pressures:  BP Readings from Last 3 Encounters:   09/18/18 (!) 146/100   11/02/17 (!) 126/98   09/14/17 148/90     Lab Results   Component Value Date/Time    Sodium 141 09/18/2018 02:37 PM    Potassium 3.7 09/18/2018 02:37 PM    Chloride 103 09/18/2018 02:37 PM    CO2 25 09/18/2018 02:37 PM    Anion gap 5 05/11/2009 08:47 AM    Glucose 83 09/18/2018 02:37 PM    BUN 10 09/18/2018 02:37 PM    Creatinine 1.17 09/18/2018 02:37 PM    BUN/Creatinine ratio 9 09/18/2018 02:37 PM    GFR est AA 86 09/18/2018 02:37 PM    GFR est non-AA 74 09/18/2018 02:37 PM    Calcium 9.6 09/18/2018 02:37 PM     No results found for: HBA1C, HGBE8, CJX2AYBB, OWI3QCSG  Lab Results   Component Value Date/Time    Cholesterol, total 143 09/18/2018 02:37 PM    HDL Cholesterol 38 (L) 09/18/2018 02:37 PM    LDL, calculated 77 09/18/2018 02:37 PM    VLDL, calculated 28 09/18/2018 02:37 PM    Triglyceride 139 09/18/2018 02:37 PM     Lab Results   Component Value Date/Time    VITAMIN D, 25-HYDROXY 35.9 09/18/2018 02:37 PM       Lab Results   Component Value Date/Time    TSH 1.170 09/18/2018 02:37 PM

## 2019-01-23 DIAGNOSIS — I10 ESSENTIAL HYPERTENSION WITH GOAL BLOOD PRESSURE LESS THAN 140/90: ICD-10-CM

## 2019-01-23 RX ORDER — HYDROCHLOROTHIAZIDE 25 MG/1
TABLET ORAL
Qty: 90 TAB | Refills: 2 | OUTPATIENT
Start: 2019-01-23 | End: 2019-10-02 | Stop reason: SDUPTHER

## 2019-01-23 NOTE — TELEPHONE ENCOUNTER
----- Message from [facility] sent at 1/23/2019 10:29 AM EST -----  Regarding: Dr. Renan Larsen from [address] [place] [address] [phone] would like to get a 90-day supply RX for hydrochlorothiazide for this pt.

## 2019-01-23 NOTE — TELEPHONE ENCOUNTER
Writer called pharmacy back regarding patient's HCTZ. #[phone] [id number]. Writer spoke with pharmacist. Writer notified pharmacist. Writer notified pharmacist that it looks like rx went to [facility] mailorder. Writer phoned in HCTZ per Dr. Carmela Aguilar orders to pharmacist. Pharmacist read back order for confirmation.

## 2019-01-23 NOTE — TELEPHONE ENCOUNTER
Phoned in HCTZ 25mg to [facility] pharmacy per Dr. Carmelita Regalado orders on 01/23/2019

## 2019-01-23 NOTE — TELEPHONE ENCOUNTER
Requested Prescriptions     Pending Prescriptions Disp Refills    hydroCHLOROthiazide (HYDRODIURIL) 25 mg tablet 90 Tab 2        Patient requesting new RX 90 Days

## 2019-01-29 ENCOUNTER — CLINICAL SUPPORT (OUTPATIENT)
Dept: FAMILY MEDICINE CLINIC | Age: 49
End: 2019-01-29

## 2019-01-29 VITALS
TEMPERATURE: 97.3 F | SYSTOLIC BLOOD PRESSURE: 126 MMHG | OXYGEN SATURATION: 98 % | DIASTOLIC BLOOD PRESSURE: 92 MMHG | HEART RATE: 86 BPM | RESPIRATION RATE: 18 BRPM | HEIGHT: 72 IN | BODY MASS INDEX: 27.52 KG/M2

## 2019-01-29 DIAGNOSIS — Z23 ENCOUNTER FOR IMMUNIZATION: Primary | ICD-10-CM

## 2019-01-29 NOTE — PATIENT INSTRUCTIONS
Vaccine Information Statement     Tdap (Tetanus, Diphtheria, Pertussis) Vaccine: What You Need to Know    Many Vaccine Information Statements are available in Japanese and other languages. See www.immunize.org/vis. Hojas de Información Sobre Vacunas están disponibles en español y en muchos otros idiomas. Visite BrendaScale.si    1. Why get vaccinated? Tetanus, diphtheria, and pertussis are very serious diseases. Tdap vaccine can protect us from these diseases. And, Tdap vaccine given to pregnant women can protect  babies against pertussis. TETANUS (Lockjaw) is rare in the [facility] today. It causes painful muscle tightening and stiffness, usually all over the body.  It can lead to tightening of muscles in the head and neck so you cant open your mouth, swallow, or sometimes even breathe. Tetanus kills about 1 out of 10 people who are infected even after receiving the best medical care. DIPHTHERIA is also rare in the [facility] today. It can cause a thick coating to form in the back of the throat.  It can lead to breathing problems, heart failure, paralysis, and death. PERTUSSIS (Whooping Cough) causes severe coughing spells, which can cause difficulty breathing, vomiting, and disturbed sleep.  It can also lead to weight loss, incontinence, and rib fractures. Up to 2 in 100 adolescents and 5 in 100 adults with pertussis are hospitalized or have complications, which could include pneumonia or death. These diseases are caused by bacteria. Diphtheria and pertussis are spread from person to person through secretions from coughing or sneezing. Tetanus enters the body through cuts, scratches, or wounds. Before vaccines, as many as 200,000 cases of diphtheria, 200,000 cases of pertussis, and hundreds of cases of tetanus, were reported in the [place] each year.  Since vaccination began, reports of cases for tetanus and diphtheria have dropped by about 99% and for pertussis by about 80%. 2. Tdap vaccine    Tdap vaccine can protect adolescents and adults from tetanus, diphtheria, and pertussis. One dose of Tdap is routinely given at age 6 or 15. People who did not get Tdap at that age should get it as soon as possible. Tdap is especially important for health care professionals and anyone having close contact with a baby younger than 12 months. Pregnant women should get a dose of Tdap during every pregnancy, to protect the  from pertussis. Infants are most at risk for severe, life-threatening complications from pertussis. Another vaccine, called Td, protects against tetanus and diphtheria, but not pertussis. A Td booster should be given every 10 years. Tdap may be given as one of these boosters if you have never gotten Tdap before. Tdap may also be given after a severe cut or burn to prevent tetanus infection. Your doctor or the person giving you the vaccine can give you more information. Tdap may safely be given at the same time as other vaccines. 3. Some people should not get this vaccine     A person who has ever had a life-threatening allergic reaction after a previous dose of any diphtheria, tetanus or pertussis containing vaccine, OR has a severe allergy to any part of this vaccine, should not get Tdap vaccine. Tell the person giving the vaccine about any severe allergies.  Anyone who had coma or long repeated seizures within 7 days after a childhood dose of DTP or DTaP, or a previous dose of Tdap, should not get Tdap, unless a cause other than the vaccine was found. They can still get Td.  Talk to your doctor if you:  - have seizures or another nervous system problem,  - had severe pain or swelling after any vaccine containing diphtheria, tetanus or pertussis,   - ever had a condition called Guillain Barré Syndrome (GBS),  - arent feeling well on the day the shot is scheduled.     4. Risks    With any medicine, including vaccines, there is a chance of side effects. These are usually mild and go away on their own. Serious reactions are also possible but are rare. Most people who get Tdap vaccine do not have any problems with it. Mild Problems following Tdap  (Did not interfere with activities)   Pain where the shot was given (about 3 in 4 adolescents or 2 in 3 adults)   Redness or swelling where the shot was given (about 1 person in 5)   Mild fever of at least 100.4°F (up to about 1 in 25 adolescents or 1 in 100 adults)   Headache (about 3 or 4 people in 10)   Tiredness (about 1 person in 3 or 4)   Nausea, vomiting, diarrhea, stomach ache (up to 1 in 4 adolescents or 1 in 10 adults)   Chills,  sore joints (about 1 person in 10)   Body aches (about 1 person in 3 or 4)    Rash, swollen glands (uncommon)    Moderate Problems following Tdap  (Interfered with activities, but did not require medical attention)   Pain where the shot was given (up to 1 in 5 or 6)    Redness or swelling where the shot was given (up to about 1 in 16 adolescents or 1 in 12 adults)   Fever over 102°F (about 1 in 100 adolescents or 1 in 250 adults)   Headache (about 1 in 7 adolescents or 1 in 10 adults)   Nausea, vomiting, diarrhea, stomach ache (up to 1 or 3 people in 100)   Swelling of the entire arm where the shot was given (up to about 1 in 500). Severe Problems following Tdap  (Unable to perform usual activities; required medical attention)   Swelling, severe pain, bleeding, and redness in the arm where the shot was given (rare). Problems that could happen after any vaccine:     People sometimes faint after a medical procedure, including vaccination. Sitting or lying down for about 15 minutes can help prevent fainting, and injuries caused by a fall. Tell your doctor if you feel dizzy, or have vision changes or ringing in the ears.      Some people get severe pain in the shoulder and have difficulty moving the arm where a shot was given. This happens very rarely.  Any medication can cause a severe allergic reaction. Such reactions from a vaccine are very rare, estimated at fewer than 1 in a million doses, and would happen within a few minutes to a few hours after the vaccination. As with any medicine, there is a very remote chance of a vaccine causing a serious injury or death. The safety of vaccines is always being monitored. For more information, visit: www.cdc.gov/vaccinesafety/    5. What if there is a serious problem? What should I look for?  Look for anything that concerns you, such as signs of a severe allergic reaction, very high fever, or unusual behavior.  Signs of a severe allergic reaction can include hives, swelling of the face and throat, difficulty breathing, a fast heartbeat, dizziness, and weakness. These would usually start a few minutes to a few hours after the vaccination. What should I do?  If you think it is a severe allergic reaction or other emergency that cant wait, call 9-1-1 or get the person to the nearest hospital. Otherwise, call your doctor.  Afterward, the reaction should be reported to the Vaccine Adverse Event Reporting System (VAERS). Your doctor might file this report, or you can do it yourself through the VAERS web site at www.vaers. [facility].gov, or by calling 9-460.419.4085. VAERS does not give medical advice. 6. The National Vaccine Injury Compensation Program    The [facility] Vaccine Injury Compensation Program (VICP) is a federal program that was created to compensate people who may have been injured by certain vaccines. Persons who believe they may have been injured by a vaccine can learn about the program and about filing a claim by calling 1-101.336.8755 or visiting the [employer] website at www.[facility].gov/vaccinecompensation. There is a time limit to file a claim for compensation. 7. How can I learn more?  Ask your doctor.  He or she can give you the vaccine package insert or suggest other sources of information.  Call your local or state health department.  Contact the Centers for Disease Control and Prevention (CDC):  - Call 2-936.164.5544 (1-800-CDC-INFO) or  - Visit CDCs website at www.cdc.gov/vaccines      Vaccine Information Statement   Tdap Vaccine  (2/24/2015)  42 FANY Mendoza [id number]-[id number]    Department of Health and Human Services  Centers for Disease Control and Prevention    Office Use Only

## 2019-01-29 NOTE — PROGRESS NOTES
Shayla De Leon  Identified pt with two pt identifiers(name and ). Chief Complaint   Patient presents with    Immunization/Injection     TDAP vaccine       1. Have you been to the ER, urgent care clinic since your last visit? Hospitalized since your last visit? No    2. Have you seen or consulted any other health care providers outside of the [address] [place] [address] since your last visit? Include any pap smears or colon screening. No    In the event something were to happen to you and you were unable to speak on your behalf, do you have an Advance Directive/ Living Will in place stating your wishes? NO    If yes, do we have a copy on file NO    If no, would you like information:            Would you like to sign up for MyChart today, if you have not already done so? Patient has a mychart  If not, would you like information on MyChart, and how to sign up at a later time? No      Medication reconciliation up to date and corrected with patient at this time. Today's provider has been notified of reason for visit, vitals and flowsheets obtained on patients. Reviewed record in preparation for visit, huddled with provider and have obtained necessary documentation.       Health Maintenance Due   Topic    DTaP/Tdap/Td series (2 - Td)       Wt Readings from Last 3 Encounters:   18 204 lb 4.8 oz (92.7 kg)   17 213 lb 1.6 oz (96.7 kg)   17 207 lb (93.9 kg)     Temp Readings from Last 3 Encounters:   19 97.3 °F (36.3 °C) (Oral)   18 98.3 °F (36.8 °C) (Oral)   17 97.8 °F (36.6 °C) (Oral)     BP Readings from Last 3 Encounters:   19 (!) 126/92   18 (!) 146/100   17 (!) 126/98     Pulse Readings from Last 3 Encounters:   19 86   18 78   17 86     Vitals:    19 0844   BP: (!) 126/92   Pulse: 86   Resp: 18   Temp: 97.3 °F (36.3 °C)   TempSrc: Oral   SpO2: 98%   Height: 6' 0.25\" (1.835 m)   PainSc:   0 - No pain         Learning Assessment:  :     Learning Assessment 2014   PRIMARY LEARNER Patient Patient   HIGHEST LEVEL OF EDUCATION - PRIMARY LEARNER  > 4 YEARS OF COLLEGE -   PRIMARY LANGUAGE ENGLISH ENGLISH   LEARNER PREFERENCE PRIMARY DEMONSTRATION DEMONSTRATION   ANSWERED BY patient rick kearney   RELATIONSHIP SELF SELF       Depression Screening:  :     PHQ over the last two weeks 2019   Little interest or pleasure in doing things Not at all   Feeling down, depressed, irritable, or hopeless Not at all   Total Score PHQ 2 0       Fall Risk Assessment:  :     Fall Risk Assessment, last 12 mths 2019   Able to walk? Yes   Fall in past 12 months? No       Abuse Screening:  :     Abuse Screening Questionnaire 2019   Do you ever feel afraid of your partner? N N   Are you in a relationship with someone who physically or mentally threatens you? N N   Is it safe for you to go home? Y Y       ADL Screening:  :     ADL Assessment 2019   Feeding yourself No Help Needed   Getting from bed to chair No Help Needed   Getting dressed No Help Needed   Bathing or showering No Help Needed   Walk across the room (includes cane/walker) No Help Needed   Using the telphone No Help Needed   Taking your medications No Help Needed   Preparing meals No Help Needed   Managing money (expenses/bills) No Help Needed   Moderately strenuous housework (laundry) No Help Needed   Shopping for personal items (toiletries/medicines) No Help Needed   Shopping for groceries No Help Needed   Driving No Help Needed   Climbing a flight of stairs No Help Needed   Getting to places beyond walking distances No Help Needed     Verbal Order Read Back Per Alexus, Andrea Dowell MD for TDAP Vaccine, # 1 injection, IM, 0 refills on 2019 due to Need for immunization. Jose Hopper verified correct name and  with PCP. Immunization/s administered 2019 by Carolyn [place] with patient's consent. Patient tolerated procedure well.   No reactions noted.

## 2019-10-02 ENCOUNTER — OFFICE VISIT (OUTPATIENT)
Dept: FAMILY MEDICINE CLINIC | Age: 49
End: 2019-10-02

## 2019-10-02 VITALS
DIASTOLIC BLOOD PRESSURE: 80 MMHG | WEIGHT: 210.2 LBS | HEART RATE: 76 BPM | BODY MASS INDEX: 28.47 KG/M2 | RESPIRATION RATE: 16 BRPM | TEMPERATURE: 97.6 F | OXYGEN SATURATION: 98 % | HEIGHT: 72 IN | SYSTOLIC BLOOD PRESSURE: 130 MMHG

## 2019-10-02 DIAGNOSIS — I10 ESSENTIAL HYPERTENSION: Primary | ICD-10-CM

## 2019-10-02 DIAGNOSIS — J30.9 ALLERGIC RHINITIS, UNSPECIFIED SEASONALITY, UNSPECIFIED TRIGGER: ICD-10-CM

## 2019-10-02 DIAGNOSIS — R05.9 COUGH: ICD-10-CM

## 2019-10-02 DIAGNOSIS — Z12.5 PROSTATE CANCER SCREENING: ICD-10-CM

## 2019-10-02 DIAGNOSIS — Z12.11 COLON CANCER SCREENING: ICD-10-CM

## 2019-10-02 DIAGNOSIS — Z80.0 FH: COLON CANCER: ICD-10-CM

## 2019-10-02 DIAGNOSIS — I10 ELEVATED BLOOD PRESSURE READING IN OFFICE WITH DIAGNOSIS OF HYPERTENSION: ICD-10-CM

## 2019-10-02 DIAGNOSIS — E55.9 VITAMIN D DEFICIENCY: ICD-10-CM

## 2019-10-02 LAB
APPEARANCE UR: CLEAR
BILIRUB UR QL STRIP: NEGATIVE
COLOR UR: YELLOW
GLUCOSE UR QL: NEGATIVE
HGB UR QL STRIP: NEGATIVE
KETONES UR QL STRIP: NEGATIVE
LEUKOCYTE ESTERASE UR QL STRIP: NEGATIVE
MICRO URNS: NORMAL
NITRITE UR QL STRIP: NEGATIVE
PH UR STRIP: 7 [PH] (ref 5–7.5)
PROT UR QL STRIP: NEGATIVE
SP GR UR: 1.01 (ref 1–1.03)
UROBILINOGEN UR STRIP-MCNC: 0.2 MG/DL (ref 0.2–1)

## 2019-10-02 RX ORDER — HYDROCHLOROTHIAZIDE 25 MG/1
TABLET ORAL
Qty: 90 TAB | Refills: 3 | Status: SHIPPED | OUTPATIENT
Start: 2019-10-02 | End: 2020-12-18 | Stop reason: SDUPTHER

## 2019-10-02 RX ORDER — NETARSUDIL AND LATANOPROST OPHTHALMIC SOLUTION, 0.02%/0.005% .2; .05 MG/ML; MG/ML
SOLUTION/ DROPS OPHTHALMIC; TOPICAL
Refills: 6 | COMMUNITY
Start: 2019-09-13

## 2019-10-02 RX ORDER — AMLODIPINE BESYLATE 5 MG/1
TABLET ORAL
Qty: 90 TAB | Refills: 3 | Status: SHIPPED | OUTPATIENT
Start: 2019-10-02 | End: 2020-09-11 | Stop reason: SDUPTHER

## 2019-10-02 NOTE — PROGRESS NOTES
Kimmy Harrington  Identified pt with two pt identifiers(name and ). Chief Complaint   Patient presents with    Complete Physical    Labs       1. Have you been to the ER, urgent care clinic since your last visit? Hospitalized since your last visit? No    2. Have you seen or consulted any other health care providers outside of the [address] [place] [address] since your last visit? Include any pap smears or colon screening. Yes-Dr. Chidi Hayes and Dr. Tito Miranda      Would you like to sign up for MyChart today, if you have not already done so? Patient has a mychart  If not, would you like information on MyChart, and how to sign up at a later time? No      Medication reconciliation up to date and corrected with patient at this time. Today's provider has been notified of reason for visit, vitals and flowsheets obtained on patients. Reviewed record in preparation for visit, huddled with provider and have obtained necessary documentation.       Health Maintenance Due   Topic    GLAUCOMA SCREENING Q2Y        Wt Readings from Last 3 Encounters:   10/02/19 210 lb 3.2 oz (95.3 kg)   18 204 lb 4.8 oz (92.7 kg)   17 213 lb 1.6 oz (96.7 kg)     Temp Readings from Last 3 Encounters:   10/02/19 97.6 °F (36.4 °C) (Oral)   19 97.3 °F (36.3 °C) (Oral)   18 98.3 °F (36.8 °C) (Oral)     BP Readings from Last 3 Encounters:   10/02/19 (!) 126/93   19 (!) 126/92   18 (!) 146/100     Pulse Readings from Last 3 Encounters:   10/02/19 76   19 86   18 78     Vitals:    10/02/19 0918   BP: (!) 126/93   Pulse: 76   Resp: 16   Temp: 97.6 °F (36.4 °C)   TempSrc: Oral   SpO2: 98%   Weight: 210 lb 3.2 oz (95.3 kg)   Height: 6' (1.829 m)   PainSc:   0 - No pain         Learning Assessment:  :     Learning Assessment 2014   PRIMARY LEARNER Patient Patient   HIGHEST LEVEL OF EDUCATION - PRIMARY LEARNER  > 4 YEARS OF COLLEGE -   PRIMARY LANGUAGE ENGLISH ENGLISH   LEARNER PREFERENCE PRIMARY DEMONSTRATION DEMONSTRATION   ANSWERED BY patient rick kearney   RELATIONSHIP SELF SELF       Depression Screening:  :     3 most recent PHQ Screens 1/29/2019   Little interest or pleasure in doing things Not at all   Feeling down, depressed, irritable, or hopeless Not at all   Total Score PHQ 2 0       Fall Risk Assessment:  :     Fall Risk Assessment, last 12 mths 1/29/2019   Able to walk? Yes   Fall in past 12 months? No       Abuse Screening:  :     Abuse Screening Questionnaire 1/29/2019 9/18/2018   Do you ever feel afraid of your partner? N N   Are you in a relationship with someone who physically or mentally threatens you? N N   Is it safe for you to go home?  Y Y       ADL Screening:  :     ADL Assessment 1/29/2019   Feeding yourself No Help Needed   Getting from bed to chair No Help Needed   Getting dressed No Help Needed   Bathing or showering No Help Needed   Walk across the room (includes cane/walker) No Help Needed   Using the telphone No Help Needed   Taking your medications No Help Needed   Preparing meals No Help Needed   Managing money (expenses/bills) No Help Needed   Moderately strenuous housework (laundry) No Help Needed   Shopping for personal items (toiletries/medicines) No Help Needed   Shopping for groceries No Help Needed   Driving No Help Needed   Climbing a flight of stairs No Help Needed   Getting to places beyond walking distances No Help Needed

## 2019-10-02 NOTE — PROGRESS NOTES
Here for annual visit. Needs meds refilled and labs. Tries to exercise. Not watching diet. Walks dog daily. Sees eye doctor for glaucoma. Gets seasonal allergies. Coughing fits. MGM with colon cancer. Patient denies chest pain, dyspnea, unexpected weight change, unexpected pain, mood or memory changes. Visit Vitals  /80 (BP 1 Location: Right arm, BP Patient Position: Sitting)   Pulse 76   Temp 97.6 °F (36.4 °C) (Oral)   Resp 16   Ht 6' (1.829 m)   Wt 210 lb 3.2 oz (95.3 kg)   SpO2 98%   BMI 28.51 kg/m²     Patient alert and cooperative. Reviewed above. Assessment:  1. HTN, high reading in office. 2. Family history of colon cancer. Plan:  1. Check labs. 2. Refilled meds. 3. Ordered Cologuard; advised patient to check with insurance for coverage. 4. Return in a year for complete physical, blood work. 5. Follow otherwise here prn.

## 2019-10-03 LAB
25(OH)D3+25(OH)D2 SERPL-MCNC: 33.4 NG/ML (ref 30–100)
ALBUMIN SERPL-MCNC: 4.5 G/DL (ref 3.5–5.5)
ALBUMIN/GLOB SERPL: 1.7 {RATIO} (ref 1.2–2.2)
ALP SERPL-CCNC: 102 IU/L (ref 39–117)
ALT SERPL-CCNC: 32 IU/L (ref 0–44)
AST SERPL-CCNC: 22 IU/L (ref 0–40)
BASOPHILS # BLD AUTO: 0 X10E3/UL (ref 0–0.2)
BASOPHILS NFR BLD AUTO: 0 %
BILIRUB SERPL-MCNC: 0.4 MG/DL (ref 0–1.2)
BUN SERPL-MCNC: 11 MG/DL (ref 6–24)
BUN/CREAT SERPL: 11 (ref 9–20)
CALCIUM SERPL-MCNC: 9.7 MG/DL (ref 8.7–10.2)
CHLORIDE SERPL-SCNC: 101 MMOL/L (ref 96–106)
CHOLEST SERPL-MCNC: 159 MG/DL (ref 100–199)
CO2 SERPL-SCNC: 23 MMOL/L (ref 20–29)
CREAT SERPL-MCNC: 0.99 MG/DL (ref 0.76–1.27)
EOSINOPHIL # BLD AUTO: 0.3 X10E3/UL (ref 0–0.4)
EOSINOPHIL NFR BLD AUTO: 5 %
ERYTHROCYTE [DISTWIDTH] IN BLOOD BY AUTOMATED COUNT: 14.2 % (ref 12.3–15.4)
GLOBULIN SER CALC-MCNC: 2.6 G/DL (ref 1.5–4.5)
GLUCOSE SERPL-MCNC: 83 MG/DL (ref 65–99)
HCT VFR BLD AUTO: 46.4 % (ref 37.5–51)
HDLC SERPL-MCNC: 36 MG/DL
HGB BLD-MCNC: 15.5 G/DL (ref 13–17.7)
IMM GRANULOCYTES # BLD AUTO: 0 X10E3/UL (ref 0–0.1)
IMM GRANULOCYTES NFR BLD AUTO: 0 %
INTERPRETATION, 910389: NORMAL
LDLC SERPL CALC-MCNC: 93 MG/DL (ref 0–99)
LYMPHOCYTES # BLD AUTO: 1.2 X10E3/UL (ref 0.7–3.1)
LYMPHOCYTES NFR BLD AUTO: 23 %
MCH RBC QN AUTO: 28.2 PG (ref 26.6–33)
MCHC RBC AUTO-ENTMCNC: 33.4 G/DL (ref 31.5–35.7)
MCV RBC AUTO: 85 FL (ref 79–97)
MONOCYTES # BLD AUTO: 0.5 X10E3/UL (ref 0.1–0.9)
MONOCYTES NFR BLD AUTO: 9 %
NEUTROPHILS # BLD AUTO: 3.4 X10E3/UL (ref 1.4–7)
NEUTROPHILS NFR BLD AUTO: 63 %
PLATELET # BLD AUTO: 173 X10E3/UL (ref 150–450)
POTASSIUM SERPL-SCNC: 4.3 MMOL/L (ref 3.5–5.2)
PROT SERPL-MCNC: 7.1 G/DL (ref 6–8.5)
PSA SERPL-MCNC: 0.7 NG/ML (ref 0–4)
RBC # BLD AUTO: 5.49 X10E6/UL (ref 4.14–5.8)
SODIUM SERPL-SCNC: 146 MMOL/L (ref 134–144)
TRIGL SERPL-MCNC: 149 MG/DL (ref 0–149)
TSH SERPL DL<=0.005 MIU/L-ACNC: 1.49 UIU/ML (ref 0.45–4.5)
VLDLC SERPL CALC-MCNC: 30 MG/DL (ref 5–40)
WBC # BLD AUTO: 5.4 X10E3/UL (ref 3.4–10.8)

## 2019-12-02 ENCOUNTER — OFFICE VISIT (OUTPATIENT)
Dept: FAMILY MEDICINE CLINIC | Age: 49
End: 2019-12-02

## 2019-12-02 VITALS
BODY MASS INDEX: 28.51 KG/M2 | TEMPERATURE: 98.2 F | DIASTOLIC BLOOD PRESSURE: 97 MMHG | HEIGHT: 72 IN | OXYGEN SATURATION: 96 % | WEIGHT: 210.5 LBS | HEART RATE: 94 BPM | RESPIRATION RATE: 16 BRPM | SYSTOLIC BLOOD PRESSURE: 130 MMHG

## 2019-12-02 DIAGNOSIS — Z01.818 PREOP GENERAL PHYSICAL EXAM: Primary | ICD-10-CM

## 2019-12-02 DIAGNOSIS — H40.9 GLAUCOMA OF LEFT EYE, UNSPECIFIED GLAUCOMA TYPE: ICD-10-CM

## 2019-12-02 DIAGNOSIS — I10 ELEVATED BLOOD PRESSURE READING IN OFFICE WITH DIAGNOSIS OF HYPERTENSION: ICD-10-CM

## 2019-12-02 DIAGNOSIS — I10 ESSENTIAL HYPERTENSION: ICD-10-CM

## 2019-12-02 RX ORDER — NEPAFENAC 3 MG/ML
SUSPENSION OPHTHALMIC
COMMUNITY
Start: 2019-11-15

## 2019-12-02 RX ORDER — OFLOXACIN 3 MG/ML
SOLUTION/ DROPS OPHTHALMIC
COMMUNITY
Start: 2019-11-15

## 2019-12-02 RX ORDER — PREDNISOLONE ACETATE 10 MG/ML
SUSPENSION/ DROPS OPHTHALMIC
COMMUNITY
Start: 2019-11-15

## 2019-12-02 NOTE — PROGRESS NOTES
Identified pt with two pt identifiers(name and ). Reviewed record in preparation for visit and have obtained necessary documentation. Chief Complaint   Patient presents with    Pre-op Exam     2019      Visit Vitals  BP (!) 130/97 (BP 1 Location: Left arm, BP Patient Position: Sitting)   Pulse 94   Temp 98.2 °F (36.8 °C) (Oral)   Resp 16   Ht 6' (1.829 m)   Wt 210 lb 8 oz (95.5 kg)   SpO2 96%   BMI 28.55 kg/m²       Pain Scale: 0 - No pain/10  Pain Location:     Health Maintenance Due   Topic    GLAUCOMA SCREENING Q2Y        Coordination of Care Questionnaire:  :   1) Have you been to an emergency room, urgent care, or hospitalized since your last visit? If yes, where when, and reason for visit? no       2. Have seen or consulted any other health care provider since your last visit? If yes, where when, and reason for visit? NO      3) Do you have an Advanced Directive/ Living Will in place? NO  If yes, do we have a copy on file NO  If no, would you like information NO    Patient is accompanied by self I have received verbal consent from Kimberly Christy to discuss any/all medical information while they are present in the room.

## 2020-09-11 DIAGNOSIS — I10 ESSENTIAL HYPERTENSION: ICD-10-CM

## 2020-09-14 RX ORDER — AMLODIPINE BESYLATE 5 MG/1
TABLET ORAL
Qty: 90 TAB | Refills: 0 | Status: SHIPPED | OUTPATIENT
Start: 2020-09-14 | End: 2021-02-17 | Stop reason: SDUPTHER

## 2020-12-18 DIAGNOSIS — I10 ESSENTIAL HYPERTENSION: ICD-10-CM

## 2020-12-18 NOTE — TELEPHONE ENCOUNTER
PCP: Deshawn Garcia MD    Last appt: Visit date not found  No future appointments.     Requested Prescriptions     Pending Prescriptions Disp Refills    hydroCHLOROthiazide (HYDRODIURIL) 25 mg tablet 90 Tab 3     Sig: TAKE 1 TABLET DAILY       Prior labs and Blood pressures:  BP Readings from Last 3 Encounters:   12/02/19 (!) 130/97   10/02/19 130/80   01/29/19 (!) 126/92     Lab Results   Component Value Date/Time    Sodium 146 (H) 10/02/2019 10:09 AM    Potassium 4.3 10/02/2019 10:09 AM    Chloride 101 10/02/2019 10:09 AM    CO2 23 10/02/2019 10:09 AM    Anion gap 5 05/11/2009 08:47 AM    Glucose 83 10/02/2019 10:09 AM    BUN 11 10/02/2019 10:09 AM    Creatinine 0.99 10/02/2019 10:09 AM    BUN/Creatinine ratio 11 10/02/2019 10:09 AM    GFR est  10/02/2019 10:09 AM    GFR est non-AA 90 10/02/2019 10:09 AM    Calcium 9.7 10/02/2019 10:09 AM     No results found for: HBA1C, HGBE8, [id number], GAY6GKSF  Lab Results   Component Value Date/Time    Cholesterol, total 159 10/02/2019 10:09 AM    HDL Cholesterol 36 (L) 10/02/2019 10:09 AM    LDL, calculated 93 10/02/2019 10:09 AM    VLDL, calculated 30 10/02/2019 10:09 AM    Triglyceride 149 10/02/2019 10:09 AM     Lab Results   Component Value Date/Time    VITAMIN D, 25-HYDROXY 33.4 10/02/2019 10:09 AM       Lab Results   Component Value Date/Time    TSH 1.490 10/02/2019 10:09 AM

## 2020-12-20 RX ORDER — HYDROCHLOROTHIAZIDE 25 MG/1
TABLET ORAL
Qty: 30 TAB | Refills: 0 | Status: SHIPPED | OUTPATIENT
Start: 2020-12-20 | End: 2021-01-22

## 2021-02-16 DIAGNOSIS — I10 ESSENTIAL HYPERTENSION: ICD-10-CM

## 2021-02-16 RX ORDER — HYDROCHLOROTHIAZIDE 25 MG/1
TABLET ORAL
Qty: 30 TAB | Refills: 0 | OUTPATIENT
Start: 2021-02-16

## 2021-02-16 NOTE — TELEPHONE ENCOUNTER
Received fax from [facility]  for 90 day supply of:   Requested Prescriptions     Pending Prescriptions Disp Refills    hydroCHLOROthiazide (HYDRODIURIL) 25 mg tablet 30 Tab 0     Sig: TAKE 1 Krys Rivera is no longer with [facility], please establish care with a new provider.

## 2021-02-17 DIAGNOSIS — I10 ESSENTIAL HYPERTENSION: ICD-10-CM

## 2021-02-17 NOTE — TELEPHONE ENCOUNTER
Last visit 12/02/2019 MD Read   Next appointment Nothing scheduled   Previous refill encounter(s)   09/14/2020 Norvasc #90     Requested Prescriptions     Pending Prescriptions Disp Refills    amLODIPine (NORVASC) 5 mg tablet 90 Tab 0     Sig: Take 1 Tab by mouth daily.  Indications: high blood pressure

## 2021-02-20 RX ORDER — AMLODIPINE BESYLATE 5 MG/1
5 TABLET ORAL DAILY
Qty: 90 TAB | Refills: 0 | Status: SHIPPED | OUTPATIENT
Start: 2021-02-20

## 2021-03-27 ENCOUNTER — IMMUNIZATION (OUTPATIENT)
Dept: FAMILY MEDICINE CLINIC | Age: 51
End: 2021-03-27
Payer: COMMERCIAL

## 2021-03-27 DIAGNOSIS — Z23 ENCOUNTER FOR IMMUNIZATION: Primary | ICD-10-CM

## 2021-03-27 PROCEDURE — 91300 COVID-19, MRNA, LNP-S, PF, 30MCG/0.3ML DOSE(PFIZER): CPT | Performed by: FAMILY MEDICINE

## 2021-03-27 PROCEDURE — 0001A COVID-19, MRNA, LNP-S, PF, 30MCG/0.3ML DOSE(PFIZER): CPT | Performed by: FAMILY MEDICINE

## 2021-04-17 ENCOUNTER — IMMUNIZATION (OUTPATIENT)
Dept: FAMILY MEDICINE CLINIC | Age: 51
End: 2021-04-17
Payer: COMMERCIAL

## 2021-04-17 DIAGNOSIS — Z23 ENCOUNTER FOR IMMUNIZATION: Primary | ICD-10-CM

## 2021-04-17 PROCEDURE — 91300 COVID-19, MRNA, LNP-S, PF, 30MCG/0.3ML DOSE(PFIZER): CPT | Performed by: FAMILY MEDICINE

## 2021-04-17 PROCEDURE — 0002A COVID-19, MRNA, LNP-S, PF, 30MCG/0.3ML DOSE(PFIZER): CPT | Performed by: FAMILY MEDICINE

## 2022-03-18 PROBLEM — H40.9 GLAUCOMA OF LEFT EYE: Status: ACTIVE | Noted: 2019-12-02

## 2022-03-18 PROBLEM — I10 ELEVATED BLOOD PRESSURE READING IN OFFICE WITH DIAGNOSIS OF HYPERTENSION: Status: ACTIVE | Noted: 2017-09-14
